# Patient Record
Sex: FEMALE | Race: WHITE | NOT HISPANIC OR LATINO | Employment: FULL TIME | URBAN - METROPOLITAN AREA
[De-identification: names, ages, dates, MRNs, and addresses within clinical notes are randomized per-mention and may not be internally consistent; named-entity substitution may affect disease eponyms.]

---

## 2020-01-21 ENCOUNTER — APPOINTMENT (OUTPATIENT)
Dept: RADIOLOGY | Facility: IMAGING CENTER | Age: 30
End: 2020-01-21
Attending: PHYSICIAN ASSISTANT
Payer: COMMERCIAL

## 2020-01-21 ENCOUNTER — OCCUPATIONAL MEDICINE (OUTPATIENT)
Dept: URGENT CARE | Facility: CLINIC | Age: 30
End: 2020-01-21
Payer: COMMERCIAL

## 2020-01-21 VITALS
BODY MASS INDEX: 36.4 KG/M2 | HEIGHT: 71 IN | TEMPERATURE: 96.9 F | RESPIRATION RATE: 14 BRPM | HEART RATE: 93 BPM | SYSTOLIC BLOOD PRESSURE: 116 MMHG | OXYGEN SATURATION: 96 % | WEIGHT: 260 LBS | DIASTOLIC BLOOD PRESSURE: 88 MMHG

## 2020-01-21 DIAGNOSIS — Z02.1 PRE-EMPLOYMENT DRUG SCREENING: ICD-10-CM

## 2020-01-21 DIAGNOSIS — S96.911A STRAIN OF RIGHT FOOT, INITIAL ENCOUNTER: ICD-10-CM
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
## 2020-01-21 DIAGNOSIS — M79.671 RIGHT FOOT PAIN: ICD-10-CM

## 2020-01-21 LAB
AMP AMPHETAMINE: NORMAL
BAR BARBITURATES: NORMAL
BREATH ALCOHOL COMMENT: NORMAL
BZO BENZODIAZEPINES: NORMAL
COC COCAINE: NORMAL
INT CON NEG: NORMAL
INT CON POS: NORMAL
MDMA ECSTASY: NORMAL
MET METHAMPHETAMINES: NORMAL
MTD METHADONE: NORMAL
OPI OPIATES: NORMAL
OXY OXYCODONE: NORMAL
PCP PHENCYCLIDINE: NORMAL
POC BREATHALIZER: 0 PERCENT (ref 0–0.01)
POC URINE DRUG SCREEN OCDRS: NORMAL
THC: NORMAL

## 2020-01-21 PROCEDURE — 73630 X-RAY EXAM OF FOOT: CPT | Mod: TC,RT | Performed by: PHYSICIAN ASSISTANT

## 2020-01-21 PROCEDURE — 99203 OFFICE O/P NEW LOW 30 MIN: CPT | Mod: 29 | Performed by: PHYSICIAN ASSISTANT

## 2020-01-21 PROCEDURE — 82075 ASSAY OF BREATH ETHANOL: CPT | Performed by: PHYSICIAN ASSISTANT

## 2020-01-21 PROCEDURE — 80305 DRUG TEST PRSMV DIR OPT OBS: CPT | Mod: 29 | Performed by: PHYSICIAN ASSISTANT

## 2020-01-21 SDOH — HEALTH STABILITY: MENTAL HEALTH: HOW OFTEN DO YOU HAVE A DRINK CONTAINING ALCOHOL?: NEVER

## 2020-01-21 ASSESSMENT — ENCOUNTER SYMPTOMS
CHILLS: 0
FEVER: 0

## 2020-01-21 NOTE — LETTER
[address] [address] [id number] [facility] [address] [place] [address]  Phone:  [phone] - Fax:  [phone]   [facility] Progress Report and Disability Certification  Date of Service: 2020   No Show:  No  Date / Time of Next Visit: 2020 @ 11:00 AM    Claim Information   Patient Name: Camden Salgado  Claim Number:     Employer:   [employer] Date of Injury: 2020     Insurer / TPA: Esis  ID / SSN:     Occupation: Jottping Tech 1  Diagnosis: Diagnoses of Strain of right foot, initial encounter, Work related injury, and Pre-employment drug screening were pertinent to this visit.    Medical Information   Related to Industrial Injury? Yes    Subjective Complaints:  DOI 2020: Patient states she stepped down off of a step and felt a pop in her right foot and is now swollen.  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  She states she took some ibuprofen with some relief of her symptoms.  Patient is able to bear some weight on her right foot.  Patient denies any previous injury or trauma to the foot.   Objective Findings: Patient has pain with plantar flexion of her right foot against resistance.  She does not have as much pain against since with dorsiflexion of the right foot.  Patient is neurovascular intact distally in the right foot.  There is mild swelling on the dorsum of the patient's foot.  No gross deformity.  Tenderness to palpation near the fifth metatarsal.   Pre-Existing Condition(s):     Assessment:   Initial Visit    Status: Additional Care Required  Permanent Disability:No    Plan:      Diagnostics:      Comments:       Disability Information   Status: Released to Restricted Duty    From:  2020  Through: 2020 Restrictions are: Temporary   Physical Restrictions   Sitting:    Standin hrs/day Stoopin hrs/day  Bending:      Squattin hrs/day Walking:  < or = to 2 hrs/day Climbin hrs/day Pushing:      Pulling:    Other:    Reaching Above Shoulder (L):   Reaching Above Shoulder (R):       Reaching Below Shoulder (L):    Reaching Below Shoulder (R):      Not to exceed Weight Limits   Carrying(hrs):   Weight Limit(lb):   Lifting(hrs):   Weight  Limit(lb):     Comments: Patient will use rest, ice, compression and elevation foot.  Ace wrap and crutches provided today. Discussed minimal weightbearing on right foot.  Follow restrictions as outlined. Follow up on Friday.    Repetitive Actions   Hands: i.e. Fine Manipulations from Grasping:     Feet: i.e. Operating Foot Controls:     Driving / Operate Machinery:     Physician Name: Crystal Kline P.A.-C. Physician Signature: CRYSTAL Singleton P.A.-C. e-Signature: Dr. Som Enamorado, Medical Director   Clinic Name / Location: [address] [address] Clinic Phone Number: Dept: [phone]   Appointment Time: 12:30 Pm Visit Start Time: 1:00 PM   Check-In Time:  12:48 Pm Visit Discharge Time:  2:51 PM   Original-Treating Physician or Chiropractor    Page 2-Insurer/TPA    Page 3-Employer    Page 4-Employee

## 2020-01-21 NOTE — PROGRESS NOTES
"Subjective:      Camden Salgado is a 29 y.o. female who presents with Foot Swelling (pt states she stepped down and intantly started to hurt and pop x today )      DOI 1/21/2020: Patient states she stepped down off of a step and felt a pop in her right foot and is now swollen.  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  She states she took some ibuprofen with some relief of her symptoms.  Patient is able to bear some weight on her right foot.  Patient denies any previous injury or trauma to the foot.     HPI  Patient presents for work-related injury as described above  Review of Systems   Constitutional: Negative for chills and fever.   Musculoskeletal:        Right foot pain   All other systems reviewed and are negative.      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   Constitutional:       General: She is not in acute distress.     Appearance: She is well-developed.   HENT:      Head: Normocephalic and atraumatic.      Right Ear: Hearing normal.      Left Ear: Hearing normal.   Eyes:      Pupils: Pupils are equal, round, and reactive to light.   Cardiovascular:      Rate and Rhythm: Normal rate and regular rhythm.      Heart sounds: Normal heart sounds.   Pulmonary:      Effort: Pulmonary effort is normal.      Breath sounds: Normal breath sounds.   Musculoskeletal:        Feet:       Comments: Right foot as described below   Skin:     General: Skin is warm and dry.   "   Neurological:      Mental Status: She is alert.      Coordination: Coordination normal.   Psychiatric:         Mood and Affect: Mood normal.         Patient has pain with plantar flexion of her right foot against resistance.  She does not have as much pain against since with dorsiflexion of the right foot.  Patient is neurovascular intact distally in the right foot.  There is mild swelling on the dorsum of the patient's foot.  No gross deformity.  Tenderness to palpation near the fifth metatarsal.   DX FOOT  FINDINGS:     There is no focal soft tissue swelling.     There is no evidence of an acute displaced fracture or dislocation.     The alignment is maintained.     There is a well-corticated ossicle projecting inferior to the base of the 5th metatarsal and lateral to the cuboid bone which could be an accessory ossicle or due to old trauma. There is some associated degenerative change within this ossicle and the   adjacent base of the 5th metatarsal. There is a superior calcaneal spur. There is dorsal spurring of the talonavicular joint.     IMPRESSION:     1.  There is no acute fracture or malalignment of the right foot.     Assessment/Plan:   1. Strain of right foot, initial encounter  - DX-FOOT-COMPLETE 3+ RIGHT; Future    2. Work related injury  - POCT Breath Alcohol Test  - POCT 11 Panel Urine Drug Screen    3. Pre-employment drug screening  Patient will use rest, ice, compression and elevation foot.  Ace wrap and crutches provided today. Follow restrictions as outlined. Follow up on Friday.  Patient agreeable to plan   Kye Kline PA-C

## 2020-01-21 NOTE — LETTER
"EMPLOYEE’S CLAIM FOR COMPENSATION/ REPORT OF INITIAL TREATMENT  FORM C-4    EMPLOYEE’S CLAIM - PROVIDE ALL INFORMATION REQUESTED   First Name  Camden Last Name  Damian Birthdate                    1990                Sex  female Claim Number   Home Address  [address] Age  29 y.o. 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 (if applicable)  stepped down    How did this injury or occupational disease occur? (Be specific an answer in detail. Use additional sheet if necessary)  Just Stepped down and felt a pop. Foot hurts & is swollen   If you believe that you have an occupational disease, when did you first have knowledge of the disability and it relationship to your employment?  na Witnesses to the Accident  na      Nature of Injury or Occupational Disease  Defer  Part(s) of Body Injured or Affected  Foot (R), ,     I certify that the above is true and correct to the best of my knowledge and that I have provided this information in order to obtain the benefits of [place] or Chapter 617 of " [place]).  I hereby authorize any physician, chiropractor, surgeon, practitioner, or other person, any hospital, including [facility] or [facility], any medical service organization, any insurance company, or other institution or organization to release to each other, any medical or other information, including benefits paid or payable, pertinent to this injury or disease, except information relative to diagnosis, treatment and/or counseling for AIDS, psychological conditions, alcohol or controlled substances, for which I must give specific authorization.  A Photostat of this authorization shall be as valid as the original.     Date   Place   Employee’s Signature   THIS REPORT MUST BE COMPLETED AND MAILED WITHIN 3 WORKING DAYS OF TREATMENT   Place  [facility]  Name of Facility  [facility]   Date  1/21/2020 Diagnosis  (S96.911A) Strain of right foot, initial encounter  (Y99.0) Work related injury  (Z02.1) Pre-employment drug screening Is there evidence the injured employee was under the influence of alcohol and/or another controlled substance at the time of accident?   Hour  1:00 PM Description of Injury or Disease  Diagnoses of Strain of right foot, initial encounter, Work related injury, and Pre-employment drug screening were pertinent to this visit. No   Treatment  Patient will use rest, ice, compression and elevation foot.  Ace wrap and crutches provided today. Follow restrictions as outlined. Follow up on Friday.  Have you advised the patient to remain off work five days or more? No   X-Ray Findings  Negative   If Yes   From Date  To Date      From information given by the employee, together with medical evidence, can you directly connect this injury or occupational disease as job incurred?  Yes If No Full Duty No Modified Duty      Is additional medical care by a physician indicated?  Yes If Modified Duty, Specify any Limitations / Restrictions  Patient should avoid standing,  "walking, stooping or squatting.   Do you know of any previous injury or disease contributing to this condition or occupational disease?                            No   Date  1/21/2020 Print Doctor’s Name Crystal Kline P.A.-C. I certify the employer’s copy of  this form was mailed on:   Address  [address] [address] Insurer’s Use Only     [facility]  [address]    Provider’s Tax ID Number  [id number] Telephone  Dept: [phone]        e-CRYSTAL Ross P.A.-C.   e-Signature: Dr. Som Enamorado,   Medical Director Degree  MD        ORIGINAL-TREATING PHYSICIAN OR CHIROPRACTOR    PAGE 2-INSURER/TPA    PAGE 3-EMPLOYER    PAGE 4-EMPLOYEE             Form C-4 (rev.10/07)              BRIEF DESCRIPTION OF RIGHTS AND BENEFITS  (Pursuant to NRS 616C.050)    Notice of Injury or Occupational Disease (Incident Report Form C-1): If an injury or occupational disease (OD) arises out of and in the course of employment, you must provide written notice to your employer as soon as practicable, but no later than 7 days after the accident or OD. Your employer shall maintain a sufficient supply of the required forms.    Claim for Compensation (Form C-4): If medical treatment is sought, the form C-4 is available at the place of initial treatment. A completed \"Claim for Compensation\" (Form C-4) must be filed within 90 days after an accident or OD. The treating physician or chiropractor must, within 3 working days after treatment, complete and mail to the employer, the employer's insurer and third-party , the Claim for Compensation.    Medical Treatment: If you require medical treatment for your on-the-job injury or OD, you may be required to select a physician or chiropractor from a list provided by your workers’ compensation insurer, if it has contracted with an Organization for Managed Care (MCO) or Preferred Provider Organization (PPO) or providers of health care. If your employer has not entered into a " contract with an MCO or PPO, you may select a physician or chiropractor from the Panel of Physicians and Chiropractors. Any medical costs related to your industrial injury or OD will be paid by your insurer.    Temporary Total Disability (TTD): If your doctor has certified that you are unable to work for a period of at least 5 consecutive days, or 5 cumulative days in a 20-day period, or places restrictions on you that your employer does not accommodate, you may be entitled to TTD compensation.    Temporary Partial Disability (TPD): If the wage you receive upon reemployment is less than the compensation for TTD to which you are entitled, the insurer may be required to pay you TPD compensation to make up the difference. TPD can only be paid for a maximum of 24 months.    Permanent Partial Disability (PPD): When your medical condition is stable and there is an indication of a PPD as a result of your injury or OD, within 30 days, your insurer must arrange for an evaluation by a rating physician or chiropractor to determine the degree of your PPD. The amount of your PPD award depends on the date of injury, the results of the PPD evaluation and your age and wage.    Permanent Total Disability (PTD): If you are medically certified by a treating physician or chiropractor as permanently and totally disabled and have been granted a PTD status by your insurer, you are entitled to receive monthly benefits not to exceed 66 2/3% of your average monthly wage. The amount of your PTD payments is subject to reduction if you previously received a PPD award.    Vocational Rehabilitation Services: You may be eligible for vocational rehabilitation services if you are unable to return to the job due to a permanent physical impairment or permanent restrictions as a result of your injury or occupational disease.    Transportation and Per Talat Reimbursement: You may be eligible for travel expenses and per talat associated with medical  treatment.    Reopening: You may be able to reopen your claim if your condition worsens after claim closure.    Appeal Process: If you disagree with a written determination issued by the insurer or the insurer does not respond to your request, you may appeal to the Department of Administration, , by following the instructions contained in your determination letter. You must appeal the determination within 70 days from the date of the determination letter at [address], [address], [place], [place] [address], or [address], [address], [place], [place] [address]. If you disagree with the  decision, you may appeal to the Department of Administration, . You must file your appeal within 30 days from the date of the  decision letter at [address], [address], [place], [place] [address], or [address], [place], [place] [address]. If you disagree with a decision of an , you may file a petition for judicial review with the District Court. You must do so within 30 days of the Appeal Officer’s decision. You may be represented by an  at your own expense or you may contact the [facility] for possible representation.    [place]  for Injured Workers (NAIW): If you disagree with a  decision, you may request that NAIW represent you without charge at an  Hearing. For information regarding denial of benefits, you may contact the [facility] at: [address], [address], [place] [address], [phone], or [address], [address], [place] [address], [phone]    To File a Complaint with the Division: If you wish to file a complaint with the  of the Division of Industrial Relations (DIR),  please contact the Workers’ Compensation Section, [address], [address], [place], [place] [address], telephone [phone] [phone], or [address], [address], [place], [place] [address], telephone [phone].    For assistance with Workers’ Compensation Issues: You may contact the Office of the Governor Consumer Health Assistance, [facility], [address], [place], [place] [address], Toll Free 1-412.909.4181, Web site: http://[email or web address]., E-mail [email or web address].                   __________________________________________________________________                                                     _________        Employee Name / Signature                                                                                                                                              Date                                                                                                                                                                                                     D-2 (rev. 06/18)

## 2020-01-24 ENCOUNTER — OCCUPATIONAL MEDICINE (OUTPATIENT)
Dept: URGENT CARE | Facility: CLINIC | Age: 30
End: 2020-01-24
Payer: COMMERCIAL

## 2020-01-24 VITALS
OXYGEN SATURATION: 96 % | HEIGHT: 71 IN | HEART RATE: 94 BPM | SYSTOLIC BLOOD PRESSURE: 112 MMHG | WEIGHT: 223.2 LBS | TEMPERATURE: 97.8 F | BODY MASS INDEX: 31.25 KG/M2 | RESPIRATION RATE: 12 BRPM | DIASTOLIC BLOOD PRESSURE: 68 MMHG

## 2020-01-24 DIAGNOSIS — S96.911D STRAIN OF RIGHT FOOT, SUBSEQUENT ENCOUNTER: ICD-10-CM

## 2020-01-24 PROCEDURE — 99213 OFFICE O/P EST LOW 20 MIN: CPT | Performed by: PHYSICIAN ASSISTANT

## 2020-01-24 RX ORDER — IBUPROFEN 200 MG
200 TABLET ORAL EVERY 6 HOURS PRN
COMMUNITY

## 2020-01-24 ASSESSMENT — ENCOUNTER SYMPTOMS
FEVER: 0
SHORTNESS OF BREATH: 0
CHILLS: 0
NAUSEA: 0
VOMITING: 0
DIARRHEA: 0
DIZZINESS: 0
ABDOMINAL PAIN: 0

## 2020-01-24 NOTE — PROGRESS NOTES
"Subjective:      Camden Salgado is a 29 y.o. female who presents with Work-Related Injury ( FV DOI: 01-21-20 R foot injury)      DOI 1/21/2020: Patient states she was walking and felt a pop with sudden pain in the right foot..  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  There was immediate swelling of the foot. She was seen in urgent care on the same day of the injury, x-rays from that time were negative. Since that time she reports significant improvement in the pain. She is able to bear weight without difficulty, but states the foot get sore by the end of the day. She has been icing the area and taking OTC ibuprofen with good relief. She hasn't returned to work and reports she was let go after the injury.      HPI    Review of Systems   Constitutional: Negative for chills and fever.   HENT: Negative for congestion.    Respiratory: Negative for shortness of breath.    Cardiovascular: Negative for chest pain.   Gastrointestinal: Negative for abdominal pain, diarrhea, nausea and vomiting.   Genitourinary: Negative.    Musculoskeletal:        + foot pain   Skin: Negative for rash.   Neurological: Negative for dizziness.          Objective:     /68   Pulse 94   Temp 36.6 °C (97.8 °F) (Temporal)   Resp 12   Ht 1.803 m (5' 11\")   Wt 101.2 kg (223 lb 3.2 oz)   LMP 01/21/2020   SpO2 96%   BMI 31.13 kg/m²      Physical Exam  Vitals signs and nursing note reviewed.   Constitutional:       General: She is not in acute distress.     Appearance: Normal appearance. She is well-developed. She is not diaphoretic.   HENT:      Head: Normocephalic and atraumatic.      Mouth/Throat:      Mouth: Mucous membranes are moist.   Eyes:      Pupils: Pupils are equal, round, and reactive to light.   Cardiovascular:      Rate and Rhythm: Normal rate.   Pulmonary:      Effort: Pulmonary effort is " normal.   Musculoskeletal: Normal range of motion.      Right foot: Normal range of motion. Tenderness present. No bony tenderness, swelling or deformity.        Feet:       Comments: No ecchymosis, edema, or rashes present. Minimal TTP over dorsal aspect of lateral foot. Distally n/v intact.    Skin:     General: Skin is warm and dry.   Neurological:      Mental Status: She is alert and oriented to person, place, and time.   Psychiatric:         Behavior: Behavior normal.                 Assessment/Plan:       1. Strain of right foot, subsequent encounter    Continue icing 2-3 times daily  OTC nsaids as needed for pain  RTC in 1 week for follow up, expect discharge/MMI at that time

## 2020-01-24 NOTE — LETTER
[facility] [place] [address] 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    Medical Information   Related to Industrial Injury? Yes    Subjective Complaints:  DOI 1/21/2020: Patient states she was walking and felt a pop with sudden pain in the right foot..  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  There was immediate swelling of the foot. She was seen in urgent care on the same day of the injury, x-rays from that time were negative. Since that time she reports significant improvement in the pain. She is able to bear weight without difficulty, but states the foot get sore by the end of the day. She has been icing the area and taking OTC ibuprofen with good relief. She hasn't returned to work and reports she was let go after the injury.    Objective Findings:     Right foot: Normal range of motion. Tenderness present. No bony tenderness, swelling or deformity.        Feet: Comments: No ecchymosis, edema, or rashes present. Minimal TTP over dorsal aspect of lateral foot. Distally n/v intact.     Pre-Existing Condition(s):     Assessment:   Condition Improved    Status: Additional Care Required  Permanent Disability:No    Plan: Medication    Diagnostics: X-ray  Comments:negative     Comments:       Disability Information   Status: Released to Full Duty    From:  1/24/2020  Through: 1/31/2020  Restrictions are: Temporary   Physical Restrictions   Sitting:    Standing:  < or = to 4 hrs/day Stooping:    Bending:      Squatting:    Walking:  < or = to 4 hrs/day Climbing:    Pushing:      Pulling:    Other:    Reaching Above Shoulder (L):   Reaching Above Shoulder (R):       Reaching Below Shoulder (L):    Reaching Below Shoulder (R):      Not to exceed Weight Limits   Carrying(hrs):   Weight Limit(lb): < or = to 25 pounds Lifting(hrs):   Weight  Limit(lb): < or = to 25 pounds   Comments: Continue icing 2-3 times daily  OTC nsaids as needed for pain  RTC in 1 week for follow up    Repetitive Actions   Hands: i.e. Fine Manipulations from Grasping:     Feet: i.e. Operating Foot Controls:     Driving / Operate Machinery:     Physician Name: Vani Candelaria P.A.-C. Physician Signature: VANI Obrien P.A.-C. e-Signature: Dr. Som Enamorado, Medical Director   Clinic Name / Location: [address] [address] Clinic Phone Number: Dept: [phone]   Appointment Time: 11:00 Am Visit Start Time: 11:18 AM   Check-In Time:  10:58 Am Visit Discharge Time: 12:05 PM   Original-Treating Physician or Chiropractor    Page 2-Insurer/TPA    Page 3-Employer    Page 4-Employee

## 2020-01-28 ENCOUNTER — NON-PROVIDER VISIT (OUTPATIENT)
Dept: OCCUPATIONAL MEDICINE | Facility: CLINIC | Age: 30
End: 2020-01-28
Payer: COMMERCIAL

## 2020-01-28 DIAGNOSIS — Z02.83 ENCOUNTER FOR DRUG SCREENING: ICD-10-CM

## 2020-01-28 PROCEDURE — 8911 PR MRO FEE: Performed by: PREVENTIVE MEDICINE
